# Patient Record
Sex: MALE | Race: BLACK OR AFRICAN AMERICAN | Employment: UNEMPLOYED | URBAN - METROPOLITAN AREA
[De-identification: names, ages, dates, MRNs, and addresses within clinical notes are randomized per-mention and may not be internally consistent; named-entity substitution may affect disease eponyms.]

---

## 2022-03-02 ENCOUNTER — TRANSCRIBE ORDER (OUTPATIENT)
Dept: SCHEDULING | Age: 42
End: 2022-03-02

## 2022-03-02 DIAGNOSIS — Q61.3 POLYCYSTIC KIDNEY DISEASE: Primary | ICD-10-CM

## 2022-03-02 DIAGNOSIS — R31.9 BLOOD IN URINE: ICD-10-CM

## 2022-03-28 ENCOUNTER — APPOINTMENT (OUTPATIENT)
Dept: CT IMAGING | Age: 42
End: 2022-03-28
Attending: NURSE PRACTITIONER
Payer: COMMERCIAL

## 2022-03-28 ENCOUNTER — HOSPITAL ENCOUNTER (EMERGENCY)
Age: 42
Discharge: HOME | End: 2022-03-28
Attending: EMERGENCY MEDICINE
Payer: COMMERCIAL

## 2022-03-28 VITALS
WEIGHT: 175 LBS | OXYGEN SATURATION: 95 % | HEIGHT: 66 IN | HEART RATE: 103 BPM | TEMPERATURE: 98.4 F | SYSTOLIC BLOOD PRESSURE: 135 MMHG | DIASTOLIC BLOOD PRESSURE: 104 MMHG | RESPIRATION RATE: 20 BRPM | BODY MASS INDEX: 28.12 KG/M2

## 2022-03-28 DIAGNOSIS — E87.1 HYPONATREMIA: ICD-10-CM

## 2022-03-28 DIAGNOSIS — R74.8 ELEVATED LIVER ENZYMES: ICD-10-CM

## 2022-03-28 DIAGNOSIS — N17.9 AKI (ACUTE KIDNEY INJURY) (HCC): ICD-10-CM

## 2022-03-28 DIAGNOSIS — R16.0 HEPATOMEGALY: ICD-10-CM

## 2022-03-28 DIAGNOSIS — K76.0 HEPATIC STEATOSIS: ICD-10-CM

## 2022-03-28 DIAGNOSIS — N30.91 CYSTITIS WITH HEMATURIA: Primary | ICD-10-CM

## 2022-03-28 LAB
ALBUMIN SERPL-MCNC: 4 G/DL (ref 3.4–5)
ALBUMIN/GLOB SERPL: 0.8 {RATIO} (ref 0.8–1.7)
ALP SERPL-CCNC: 79 U/L (ref 45–117)
ALT SERPL-CCNC: 124 U/L (ref 16–61)
ANION GAP SERPL CALC-SCNC: 10 MMOL/L (ref 3–18)
APPEARANCE UR: ABNORMAL
AST SERPL-CCNC: 215 U/L (ref 10–38)
BACTERIA URNS QL MICRO: ABNORMAL /HPF
BASOPHILS # BLD: 0 K/UL (ref 0–0.1)
BASOPHILS NFR BLD: 0 % (ref 0–2)
BILIRUB SERPL-MCNC: 1.1 MG/DL (ref 0.2–1)
BILIRUB UR QL: ABNORMAL
BUN SERPL-MCNC: 35 MG/DL (ref 7–18)
BUN/CREAT SERPL: 22 (ref 12–20)
CALCIUM SERPL-MCNC: 11.2 MG/DL (ref 8.5–10.1)
CHLORIDE SERPL-SCNC: 90 MMOL/L (ref 100–111)
CO2 SERPL-SCNC: 29 MMOL/L (ref 21–32)
COLOR UR: ABNORMAL
CREAT SERPL-MCNC: 1.6 MG/DL (ref 0.6–1.3)
DIFFERENTIAL METHOD BLD: ABNORMAL
EOSINOPHIL # BLD: 0 K/UL (ref 0–0.4)
EOSINOPHIL NFR BLD: 0 % (ref 0–5)
EPITH CASTS URNS QL MICRO: ABNORMAL /LPF (ref 0–5)
ERYTHROCYTE [DISTWIDTH] IN BLOOD BY AUTOMATED COUNT: 12.8 % (ref 11.6–14.5)
GLOBULIN SER CALC-MCNC: 5.1 G/DL (ref 2–4)
GLUCOSE SERPL-MCNC: 149 MG/DL (ref 74–99)
GLUCOSE UR STRIP.AUTO-MCNC: NEGATIVE MG/DL
HCT VFR BLD AUTO: 32.4 % (ref 36–48)
HGB BLD-MCNC: 11.2 G/DL (ref 13–16)
HGB UR QL STRIP: ABNORMAL
IMM GRANULOCYTES # BLD AUTO: 0 K/UL (ref 0–0.04)
IMM GRANULOCYTES NFR BLD AUTO: 0 % (ref 0–0.5)
INR PPP: 1 (ref 0.8–1.2)
KETONES UR QL STRIP.AUTO: ABNORMAL MG/DL
LEUKOCYTE ESTERASE UR QL STRIP.AUTO: ABNORMAL
LYMPHOCYTES # BLD: 1.1 K/UL (ref 0.9–3.6)
LYMPHOCYTES NFR BLD: 16 % (ref 21–52)
MCH RBC QN AUTO: 30.1 PG (ref 24–34)
MCHC RBC AUTO-ENTMCNC: 34.6 G/DL (ref 31–37)
MCV RBC AUTO: 87.1 FL (ref 78–100)
MONOCYTES # BLD: 1.3 K/UL (ref 0.05–1.2)
MONOCYTES NFR BLD: 18 % (ref 3–10)
NEUTS SEG # BLD: 4.5 K/UL (ref 1.8–8)
NEUTS SEG NFR BLD: 65 % (ref 40–73)
NITRITE UR QL STRIP.AUTO: NEGATIVE
NRBC # BLD: 0 K/UL (ref 0–0.01)
NRBC BLD-RTO: 0 PER 100 WBC
PH UR STRIP: 5.5 [PH] (ref 5–8)
PLATELET # BLD AUTO: 274 K/UL (ref 135–420)
PMV BLD AUTO: 10 FL (ref 9.2–11.8)
POTASSIUM SERPL-SCNC: 3.7 MMOL/L (ref 3.5–5.5)
PROT SERPL-MCNC: 9.1 G/DL (ref 6.4–8.2)
PROT UR STRIP-MCNC: 100 MG/DL
PROTHROMBIN TIME: 12.6 SEC (ref 11.5–15.2)
RBC # BLD AUTO: 3.72 M/UL (ref 4.35–5.65)
RBC #/AREA URNS HPF: ABNORMAL /HPF (ref 0–5)
SODIUM SERPL-SCNC: 129 MMOL/L (ref 136–145)
SP GR UR REFRACTOMETRY: 1.01 (ref 1–1.03)
UROBILINOGEN UR QL STRIP.AUTO: 1 EU/DL (ref 0.2–1)
WBC # BLD AUTO: 7 K/UL (ref 4.6–13.2)
WBC URNS QL MICRO: ABNORMAL /HPF (ref 0–4)

## 2022-03-28 PROCEDURE — 74011000250 HC RX REV CODE- 250: Performed by: NURSE PRACTITIONER

## 2022-03-28 PROCEDURE — 99284 EMERGENCY DEPT VISIT MOD MDM: CPT

## 2022-03-28 PROCEDURE — 87086 URINE CULTURE/COLONY COUNT: CPT

## 2022-03-28 PROCEDURE — 81001 URINALYSIS AUTO W/SCOPE: CPT

## 2022-03-28 PROCEDURE — 85025 COMPLETE CBC W/AUTO DIFF WBC: CPT

## 2022-03-28 PROCEDURE — 80074 ACUTE HEPATITIS PANEL: CPT

## 2022-03-28 PROCEDURE — 80053 COMPREHEN METABOLIC PANEL: CPT

## 2022-03-28 PROCEDURE — 87491 CHLMYD TRACH DNA AMP PROBE: CPT

## 2022-03-28 PROCEDURE — 87661 TRICHOMONAS VAGINALIS AMPLIF: CPT

## 2022-03-28 PROCEDURE — 74011250636 HC RX REV CODE- 250/636: Performed by: NURSE PRACTITIONER

## 2022-03-28 PROCEDURE — 96374 THER/PROPH/DIAG INJ IV PUSH: CPT

## 2022-03-28 PROCEDURE — 74176 CT ABD & PELVIS W/O CONTRAST: CPT

## 2022-03-28 PROCEDURE — 96361 HYDRATE IV INFUSION ADD-ON: CPT

## 2022-03-28 PROCEDURE — 85610 PROTHROMBIN TIME: CPT

## 2022-03-28 RX ORDER — CEPHALEXIN 500 MG/1
500 CAPSULE ORAL 2 TIMES DAILY
Qty: 28 CAPSULE | Refills: 0 | Status: SHIPPED | OUTPATIENT
Start: 2022-03-28 | End: 2022-04-11

## 2022-03-28 RX ADMIN — WATER 1 G: 1 INJECTION INTRAMUSCULAR; INTRAVENOUS; SUBCUTANEOUS at 18:44

## 2022-03-28 RX ADMIN — SODIUM CHLORIDE 1000 ML: 9 INJECTION, SOLUTION INTRAVENOUS at 14:57

## 2022-03-28 NOTE — ED TRIAGE NOTES
Pt complains of hematuria. Denies painful urination. Has hypertension, but states he \"ran out of his medications two days ago. \"

## 2022-03-28 NOTE — DISCHARGE INSTRUCTIONS
Abnormality of CT scan concern for possible cancer of your kidneys. Please call for an appointment with the kidney specialist- referral given. Cut back on alcohol.

## 2022-03-28 NOTE — ED PROVIDER NOTES
EMERGENCY DEPARTMENT HISTORY AND PHYSICAL EXAM    Date: 3/28/2022  Patient Name: Silvano Santos    History of Presenting Illness     Chief Complaint   Patient presents with    Blood in Urine         History Provided By: Patient      Additional History (Context): Silvano Santos is a 47-year-old male with significant past medical history of polycystic kidney dx, HTN, schizophrenia, bipolar, and PTSD who presents to the ER with complaints of intermittent hematuria for the last 2 weeks. Associated symptoms include decreased appetite and nausea. No abdominal pain, back pain, fever, chills, vomiting, diarrhea, or weight loss. States he does not mind being tested for STDs but is not sexually active at this time and denies any penile discharge or lesions. No history of kidney stones. PCP: Other, MD Doyle        Past History     Past Medical History:  No past medical history on file. Past Surgical History:  No past surgical history on file. Family History:  No family history on file. Social History:  Social History     Tobacco Use    Smoking status: Not on file    Smokeless tobacco: Not on file   Substance Use Topics    Alcohol use: Not on file    Drug use: Not on file       Allergies:  No Known Allergies      Review of Systems     Review of Systems   Constitutional: Positive for decreased appetite. Negative for chills and fever. HENT: Negative for nasal congestion, sore throat, rhinorrhea  Eyes: Negative. Respiratory: negative  cough and negative for shortness of breath. Cardiovascular: Negative for chest pain and palpitations. Gastrointestinal: Negative for abdominal pain, constipation, diarrhea. Positive for nausea. Genitourinary: Positive for hematuria. Negative for difficulty urinating, hematuria, and flank pain. Musculoskeletal: Negative for back pain. Negative for gait problem and neck pain. Skin: Negative for rash. Allergic/Immunologic: Negative.     Neurological: Negative for dizziness, weakness, numbness and headaches. Psychiatric/Behavioral: Negative. All other systems reviewed and are negative. All Other Systems Negative  Physical Exam     Vitals:    03/28/22 1411 03/28/22 1848   BP: (!) 123/100 (!) 135/104   Pulse: (!) 103    Resp: 20    Temp: 98.4 °F (36.9 °C)    SpO2: 95%    Weight: 79.4 kg (175 lb)    Height: 5' 6\" (1.676 m)      Physical Exam  Vitals and nursing note reviewed. Constitutional:       General: He is not in acute distress. Appearance: Normal appearance. He is not ill-appearing, toxic-appearing or diaphoretic. HENT:      Head: Normocephalic and atraumatic. Nose: Nose normal. No congestion or rhinorrhea. Mouth/Throat:      Mouth: Mucous membranes are moist.      Pharynx: Oropharynx is clear. No oropharyngeal exudate or posterior oropharyngeal erythema. Eyes:      General: Vision grossly intact. Gaze aligned appropriately. No scleral icterus. Right eye: No discharge. Left eye: No discharge. Conjunctiva/sclera: Conjunctivae normal.   Cardiovascular:      Rate and Rhythm: Regular rhythm. Tachycardia present. Pulses: Normal pulses. Heart sounds: Normal heart sounds. No murmur heard. No gallop. Pulmonary:      Effort: Pulmonary effort is normal. No respiratory distress. Breath sounds: Normal breath sounds. No stridor. No wheezing, rhonchi or rales. Chest:      Chest wall: No tenderness. Abdominal:      General: Abdomen is flat. Palpations: Abdomen is soft. Tenderness: There is no abdominal tenderness. There is no right CVA tenderness, left CVA tenderness, guarding or rebound. Musculoskeletal:         General: Normal range of motion. Cervical back: Full passive range of motion without pain, normal range of motion and neck supple. No rigidity or tenderness. Lymphadenopathy:      Cervical: No cervical adenopathy. Skin:     General: Skin is warm and dry.       Capillary Refill: Capillary refill takes less than 2 seconds. Findings: No rash. Neurological:      General: No focal deficit present. Mental Status: He is alert and oriented to person, place, and time. Psychiatric:         Mood and Affect: Mood normal.           Diagnostic Study Results     Labs -     No results found for this or any previous visit (from the past 12 hour(s)). Radiologic Studies -   CT ABD PELV WO CONT   Final Result      Polycystic kidney disease. Innumerable bilateral renal masses which range from simple water to hyperdense. Cannot exclude solid masses. Bilateral small intrarenal calcifications which are either nonobstructing   calculi or calcifications within the wall of cystic masses. Mild circumferential urinary bladder wall thickening. Nonspecific and can be   seen with cystitis. Hepatomegaly with severe steatosis. Report provided to the emergency department at 1740 hrs. CT Results  (Last 48 hours)               03/28/22 1711  CT ABD PELV WO CONT Final result    Impression:      Polycystic kidney disease. Innumerable bilateral renal masses which range from simple water to hyperdense. Cannot exclude solid masses. Bilateral small intrarenal calcifications which are either nonobstructing   calculi or calcifications within the wall of cystic masses. Mild circumferential urinary bladder wall thickening. Nonspecific and can be   seen with cystitis. Hepatomegaly with severe steatosis. Report provided to the emergency department at 1740 hrs. Narrative:  EXAM: CT ABDOMEN AND PELVIS WITHOUT CONTRAST       CLINICAL HISTORY/INDICATION:  hematuria       COMPARISON: None. TECHNIQUE: No intravenous contrast was utilized for this helical thin section CT   of the abdomen and pelvis. Coronal and sagittal reformations obtained. Evaluation of the solid organs, bowel wall, and vascular structures are   therefore limited.        All CT scans at this facility are performed using dose optimization technique as   appropriate to a performed exam, to include automated exposure control,   adjustment of the mA and/or kV according to patient's size (including   appropriate matching for site-specific examinations), or use of iterative   reconstruction technique. FINDINGS:        Abdomen-        The lung bases are clear; no pleural fluid or pneumothorax evident. Severe hepatic steatosis. The liver is enlarged measuring 21 cm coronal image   21. There is no biliary dilatation. .    The gallbladder is unremarkable. No abnormality of the spleen. The kidneys are bilaterally enlarged with innumerable varying in size masses   which range from simple water to isodense to hyperdense. The left kidney   measures 16 cm. The right kidney measures 17 cm. Right renal lower pole 3 mm   calcification which could either be a calculus or within the wall of one of the   innumerable renal masses. Left kidney midpole posterior to adjacent   calcifications a larger measuring 7 mm. These could either be within the   collecting system or within the wall of a cystic mass. There is an additional   third 3 mm calcification in the periphery of the left kidney at the upper to   midpole which is likely too peripheral to be within a calyx and therefore is   likely within the wall of a mass. There is no perinephric stranding. There is no hydronephrosis nor hydroureter. Adrenals and Pancreas  are of normal CT appearance. There is no free fluid or free air. The large and small bowel seem unremarkable. The appendix is normal.       Pelvis -       There is no free pelvic fluid. The pelvic viscera are unremarkable. The bladder is incompletely distended with mild circumferential wall thickening   measuring 6 mm. Review of osseous structures throughout on bone window settings shows no   significant osseous pathology.                CXR Results  (Last 50 hours)    None            Medical Decision Making   I am the first provider for this patient. I reviewed the vital signs, available nursing notes, past medical history, past surgical history, family history and social history. Vital Signs-Reviewed the patient's vital signs. Records Reviewed: Nursing notes, old medical records and any previous labs, imaging, visits, consultations pertinent to patient care    Procedures:  Procedures      ED Course: Progress Notes, Reevaluation, and Consults:  1:58 PM  Initial assessment performed. The patients presenting problems have been discussed, and they/their family are in agreement with the care plan formulated and outlined with them. I have encouraged them to ask questions as they arise throughout their visit. 4:30 PM CBC shows no leukocytosis with mild anemia and normal platelets. Urinalysis does show evidence of cystitis with moderate leukocyte esterase, trace amount of blood, ketones, and protein. Urine culture sent and pending. CMP is abnormal with a sodium of 129 and elevated kidney function with a creatinine of 1.6 and BUN of 35. Calcium is also elevated at 11.2 concerning for possible malignancy. Liver enzymes are also elevated with an ALT of 124, AST of 959 with an alcoholic-like pattern consistent with patient's history. He does admit to drinking 1-2 large davis-45 beers daily. unfortunately, there are no labs available to compare baseline kidney or liver function. 5:55 PM CT scan resulted showing significant abnormalities with polycystic kidney disease and innumerable bilateral renal masses and cannot exclude solid masses. There are also mild circumferential bladder wall thickening consistent with cystitis with his contaminated urine as well as hepatomegaly and severe steatosis.  I discussed these abnormalities with attending physician, Dr. Nancy Wise and he is in agreement with the plan to consult with urology to arrange for close follow-up outpatient for patient for a potential malignancy work-up. 6:15 PM consultation with urology, Dr. Brooklyn Lockwood Given. Standard discussion regarding patient's history, physical exam findings and he agrees to see patient outpatient. 6:25 PM had a very lengthy and nohemi discussion with the patient regarding need for very close follow-up to rule out cancer in the kidneys. I also discussed the hepatic steatosis and hepatomegaly and we will add on a hepatitis panel as there is concern for potential cirrhosis or hepatitis of the liver due to alcohol intake and I discussed slowly cutting back on his alcohol. Patient verbalized understanding of this discussion and agrees with the plan of care. He received a dose of Rocephin in the ER for cystitis and he tolerated this well we will discharge him home with a 2-week course of cephalexin. ER return precautions discussed. Provider Notes (Medical Decision Making):     Differential diagnosis: UTI, prostate disorder, STD, kidney stone, complication of polycystic kidney disease    Patient presents ambulatory in no acute distress, well-hydrated, non-toxic in appearance, with elevated blood pressure, mild tachycardia and otherwise normal vitals and afebrile. Benign exam of abdomen with no tenderness whatsoever and no peritoneal signs. Will obtain appropriate studies to evaluate patient's complaints and treat symptomatically. Will disposition after reassessment assuming no clinical change or worsening and appropriate response to symptomatic treatment. MED RECONCILIATION:  No current facility-administered medications for this encounter. Current Outpatient Medications   Medication Sig    cephALEXin (Keflex) 500 mg capsule Take 1 Capsule by mouth two (2) times a day for 14 days. Disposition:  Home in stable condition. DISCHARGE NOTE:     Patient has been reexamined. Patient has no new complaints, changes, or physical findings.   Patient demonstrates understanding of current diagnoses and is in agreement with the treatment plan. They are advised that while the likelihood of serious underlying condition is low at this point given the evaluation performed today, we cannot fully rule it out. They are advised to immediately return with any new symptoms or worsening of current condition. Care plan outlined and precautions discussed. Discussed proper way to take medications. Medication use, risk/benefit, side effects and precautions discussed in detail. Discussed treatment plan, return precautions, symptomatic relief, and expected time to improvement. All questions answered. Patient is stable for discharge and outpatient management. Patient is ready to go home. Follow-up Information     Follow up With Specialties Details Why Contact Info    Given, Cornelio Cushing, MD Urology Schedule an appointment as soon as possible for a visit  Follow-up from the Emergency Department [address] [facility] EMERGENCY DEPT Emergency Medicine  As needed, If symptoms worsen [address] [place] [id number]  [phone]          Discharge Medication List as of 3/28/2022  6:47 PM      START taking these medications    Details   cephALEXin (Keflex) 500 mg capsule Take 1 Capsule by mouth two (2) times a day for 14 days. , Normal, Disp-28 Capsule, R-0                   Diagnosis     Clinical Impression:   1. Cystitis with hematuria    2. ISREAL (acute kidney injury) ([address]    3. Elevated liver enzymes    4. Hyponatremia    5. Hepatomegaly    6. Hepatic steatosis        Dictation disclaimer:  Please note that this dictation was completed with [employer], the computer voice recognition software. Quite often unanticipated grammatical, syntax, homophones, and other interpretive errors are inadvertently transcribed by the computer software. Please disregard these errors. Please excuse any errors that have escaped final proofreading.

## 2022-03-29 LAB
C TRACH RRNA SPEC QL NAA+PROBE: NEGATIVE
HAV IGM SER QL: NEGATIVE
HBV CORE IGM SER QL: NEGATIVE
HBV SURFACE AG SER QL: <0.1 INDEX
HBV SURFACE AG SER QL: NEGATIVE
HCV AB SER IA-ACNC: <0.02 INDEX
HCV AB SERPL QL IA: NEGATIVE
HCV COMMENT,HCGAC: NORMAL
N GONORRHOEA RRNA SPEC QL NAA+PROBE: NEGATIVE
SP1: NORMAL
SP2: NORMAL
SP3: NORMAL
SPECIMEN SOURCE: NORMAL

## 2022-03-30 LAB
BACTERIA SPEC CULT: NORMAL
CC UR VC: NORMAL
SERVICE CMNT-IMP: NORMAL

## 2022-04-01 LAB
SPECIMEN SOURCE: NORMAL
T VAGINALIS RRNA VAG QL NAA+PROBE: NEGATIVE

## 2023-01-30 DIAGNOSIS — Q61.3 POLYCYSTIC KIDNEY DISEASE: Primary | ICD-10-CM

## 2023-01-30 DIAGNOSIS — R10.32 LEFT LOWER QUADRANT PAIN: Primary | ICD-10-CM

## 2023-02-01 DIAGNOSIS — Q61.3 POLYCYSTIC KIDNEY DISEASE: Primary | ICD-10-CM

## 2023-02-03 DIAGNOSIS — Q61.3 POLYCYSTIC KIDNEY DISEASE: Primary | ICD-10-CM
